# Patient Record
Sex: MALE | Race: WHITE | URBAN - METROPOLITAN AREA
[De-identification: names, ages, dates, MRNs, and addresses within clinical notes are randomized per-mention and may not be internally consistent; named-entity substitution may affect disease eponyms.]

---

## 2017-11-05 ENCOUNTER — OFFICE VISIT (OUTPATIENT)
Dept: URGENT CARE | Facility: URGENT CARE | Age: 52
End: 2017-11-05
Payer: COMMERCIAL

## 2017-11-05 VITALS
SYSTOLIC BLOOD PRESSURE: 162 MMHG | OXYGEN SATURATION: 98 % | RESPIRATION RATE: 14 BRPM | WEIGHT: 211.2 LBS | TEMPERATURE: 97.6 F | DIASTOLIC BLOOD PRESSURE: 83 MMHG | HEART RATE: 67 BPM

## 2017-11-05 DIAGNOSIS — G56.22 ULNAR NEUROPATHY AT ELBOW OF LEFT UPPER EXTREMITY: ICD-10-CM

## 2017-11-05 DIAGNOSIS — I10 BENIGN ESSENTIAL HYPERTENSION: Chronic | ICD-10-CM

## 2017-11-05 DIAGNOSIS — M77.02 MEDIAL EPICONDYLITIS OF ELBOW, LEFT: Primary | ICD-10-CM

## 2017-11-05 PROCEDURE — 99203 OFFICE O/P NEW LOW 30 MIN: CPT | Performed by: FAMILY MEDICINE

## 2017-11-05 RX ORDER — GABAPENTIN 300 MG/1
CAPSULE ORAL
Qty: 90 CAPSULE | Refills: 0 | Status: SHIPPED | OUTPATIENT
Start: 2017-11-05 | End: 2017-12-02

## 2017-11-05 ASSESSMENT — PAIN SCALES - GENERAL: PAINLEVEL: SEVERE PAIN (7)

## 2017-11-05 NOTE — PATIENT INSTRUCTIONS
Understanding Medial Epicondylitis    Several muscles attach to the arm at the elbow joint. The tough bands of tissue that attach muscle to bones are called tendons. The bone in the upper arm has knobs on the farthest end called epicondyles. Tendons attach some arm muscles to these knobs. The tissues in this area can become irritated.  Epicondylitis is the medical term for a painful elbow over the epicondyle. Medial refers to the inner side of the elbow. Medial epicondylitis is sometimes called  golfer s elbow.      How to say it  BABS[facility]iffani[facility]-LNXR-dye-lie-tis   Causes of medial epicondylitis  A painful inner elbow may be caused by:    Using an elbow or hand the same way over and over    Using poor form or too much force in a sport such as golf, tennis, or baseball    Lifting too heavy a weight    Other injuries to the arm or elbow  Symptoms of medial epicondylitis    Pain or tenderness on the inside of the elbow that may travel down the forearm    Pain when moving the wrist    Pain or weakness when gripping something    A crackling sound or grating feeling when moving the elbow  Treatment for medial epicondylitis  Treatments may include:    Avoiding or changing the action that caused the problem. This helps prevent irritating the tissues more.    Prescription or over-the-counter pain medicines. These help reduce inflammation, swelling, and pain.    Cold or heat packs. These help reduce pain and swelling.    Stretching and other exercises. These improve flexibility and strength.    Physical therapy. This may include exercises or other treatments.    Injections of medicine. This may relieve symptoms.  If other treatments do not relieve symptoms, you may need surgery.  Possible complications  If you don t give your elbow time to heal, symptoms may return or get worse. Follow your healthcare provider s instructions on resting and treating your elbow.     When to call your healthcare provider  Call your  "healthcare provider right away if you have any of these:    Fever of 100.4 F (38 C) or higher, or as directed    Redness, swelling, or warmth that gets worse    Symptoms that don t get better with prescribed medicines, or get worse    New symptoms   Date Last Reviewed: 3/10/2016    0489-1785 [employer] [address] [place] [address], [place] [address]. All rights reserved. This information is not intended as a substitute for professional medical care. Always follow your healthcare professional's instructions.        Ulnar Nerve Palsy  The ulnar nerve travels down the arm from the shoulder to the hand. It controls movement and feeling in the wrist and hand. Damage to this nerve can cause a condition called ulnar nerve palsy.  A common cause of ulnar nerve palsy is leaning on the elbow for long periods of time. Injury to the arm or elbow, such as a fracture, can also damage the ulnar nerve.  Symptoms of ulnar nerve palsy include:    Numbness, tingling, or burning (often described as \"pins and needles\")    Pain    Weakness in the hand and fingers  The treatment depends on the cause of the nerve damage. In some cases, the problem will go away on its once pressure to the nerve is relieved. Splinting the wrist to limit movement may help with healing. If the problem is due to trauma or injury, physical therapy may be prescribed. Corticosteroids injections into the area may reduce swelling and pressure on the nerve. Surgery to repair the nerve may be needed for chronic symptoms that do not respond to simpler treatments.  Home care    Rest the wrist and arm until normal feeling and strength return.    If you were given a splint or sling, wear it as directed.    Avoid positions leaning on your elbows.    If medicines were prescribed for pain or nerve sensations, take them as directed.  Follow-up care  Follow up with your healthcare provider or as advised by our staff.  When to seek medical advice  Call your healthcare " provider right away if you have any of the following:    Increasing arm swelling or pain    Numbness or weakness of the arm    Symptoms spread to other parts of the body    Slurred speech, confusion    Trouble speaking, walking, or seeing  Date Last Reviewed: 9/25/2015 2000-2017 [employer]. [address] [place] [address]. All rights reserved. This information is not intended as a substitute for professional medical care. Always follow your healthcare professional's instructions.

## 2017-11-05 NOTE — NURSING NOTE
Chief Complaint   Patient presents with     Tendon Injury     Possible tendon injury in left forearm       Initial /83  Pulse 67  Temp 97.6  F (36.4  C) (Oral)  Resp 14  Wt 211 lb 3.2 oz (95.8 kg)  SpO2 98% There is no height or weight on file to calculate BMI.  Medication Reconciliation: complete     Keke Chavarria, CMA

## 2017-11-05 NOTE — MR AVS SNAPSHOT
After Visit Summary   11/5/2017    James Hernandez    MRN: [id number]           Patient Information     Date Of Birth          1965        Visit Information        Provider Department      11/5/2017 2:25 PM Benita Fenton MD [facility]        Today's Diagnoses     Medial epicondylitis of elbow, left    -  1    Ulnar neuropathy at elbow of left upper extremity        Benign essential hypertension          Care Instructions      Understanding Medial Epicondylitis    Several muscles attach to the arm at the elbow joint. The tough bands of tissue that attach muscle to bones are called tendons. The bone in the upper arm has knobs on the farthest end called epicondyles. Tendons attach some arm muscles to these knobs. The tissues in this area can become irritated.  Epicondylitis is the medical term for a painful elbow over the epicondyle. Medial refers to the inner side of the elbow. Medial epicondylitis is sometimes called  golfer s elbow.      How to say it  BABS-tiffani-lex cw-svj-HIXN-dye-lie-tis   Causes of medial epicondylitis  A painful inner elbow may be caused by:    Using an elbow or hand the same way over and over    Using poor form or too much force in a sport such as golf, tennis, or baseball    Lifting too heavy a weight    Other injuries to the arm or elbow  Symptoms of medial epicondylitis    Pain or tenderness on the inside of the elbow that may travel down the forearm    Pain when moving the wrist    Pain or weakness when gripping something    A crackling sound or grating feeling when moving the elbow  Treatment for medial epicondylitis  Treatments may include:    Avoiding or changing the action that caused the problem. This helps prevent irritating the tissues more.    Prescription or over-the-counter pain medicines. These help reduce inflammation, swelling, and pain.    Cold or heat packs. These help reduce pain and swelling.    Stretching and other exercises. These  "improve flexibility and strength.    Physical therapy. This may include exercises or other treatments.    Injections of medicine. This may relieve symptoms.  If other treatments do not relieve symptoms, you may need surgery.  Possible complications  If you don t give your elbow time to heal, symptoms may return or get worse. Follow your healthcare provider s instructions on resting and treating your elbow.     When to call your healthcare provider  Call your healthcare provider right away if you have any of these:    Fever of 100.4 F (38 C) or higher, or as directed    Redness, swelling, or warmth that gets worse    Symptoms that don t get better with prescribed medicines, or get worse    New symptoms   Date Last Reviewed: 3/10/2016    8692-1202 [employer]. [address] [place] [address], [place] [address]. All rights reserved. This information is not intended as a substitute for professional medical care. Always follow your healthcare professional's instructions.        Ulnar Nerve Palsy  The ulnar nerve travels down the arm from the shoulder to the hand. It controls movement and feeling in the wrist and hand. Damage to this nerve can cause a condition called ulnar nerve palsy.  A common cause of ulnar nerve palsy is leaning on the elbow for long periods of time. Injury to the arm or elbow, such as a fracture, can also damage the ulnar nerve.  Symptoms of ulnar nerve palsy include:    Numbness, tingling, or burning (often described as \"pins and needles\")    Pain    Weakness in the hand and fingers  The treatment depends on the cause of the nerve damage. In some cases, the problem will go away on its once pressure to the nerve is relieved. Splinting the wrist to limit movement may help with healing. If the problem is due to trauma or injury, physical therapy may be prescribed. Corticosteroids injections into the area may reduce swelling and pressure on the nerve. Surgery to repair the nerve may be needed for " chronic symptoms that do not respond to simpler treatments.  Home care    Rest the wrist and arm until normal feeling and strength return.    If you were given a splint or sling, wear it as directed.    Avoid positions leaning on your elbows.    If medicines were prescribed for pain or nerve sensations, take them as directed.  Follow-up care  Follow up with your healthcare provider or as advised by our staff.  When to seek medical advice  Call your healthcare provider right away if you have any of the following:    Increasing arm swelling or pain    Numbness or weakness of the arm    Symptoms spread to other parts of the body    Slurred speech, confusion    Trouble speaking, walking, or seeing  Date Last Reviewed: 9/25/2015 2000-2017 [employer]. [address] [place] [address], [place] [address]. All rights reserved. This information is not intended as a substitute for professional medical care. Always follow your healthcare professional's instructions.                Follow-ups after your visit        Additional Services     ORTHO  REFERRAL       [facility] is referring you to the Orthopedic  Services at [facility].       The  Representative will assist you in the coordination of your Orthopedic and Musculoskeletal Care as prescribed by your physician.    The  Representative will call you within 1 business day to help schedule your appointment, or you may contact the  Representative at:    All areas ~ [phone]     Type of Referral : Non Surgical       Timeframe requested: Routine    Coverage of these services is subject to the terms and limitations of your health insurance plan.  Please call member services at your health plan with any benefit or coverage questions.      If X-rays, CT or MRI's have been performed, please contact the facility where they were done to arrange for , prior to your scheduled  "appointment.  Please bring this referral request to your appointment and present it to your specialist.                  Who to contact     If you have questions or need follow up information about today's clinic visit or your schedule please contact [facility] directly at [phone].  Normal or non-critical lab and imaging results will be communicated to you by MyChart, letter or phone within 4 business days after the clinic has received the results. If you do not hear from us within 7 days, please contact the clinic through [employer] or phone. If you have a critical or abnormal lab result, we will notify you by phone as soon as possible.  Submit refill requests through [employer] or call your pharmacy and they will forward the refill request to us. Please allow 3 business days for your refill to be completed.          Additional Information About Your Visit        [employer] Information     [employer] lets you send messages to your doctor, view your test results, renew your prescriptions, schedule appointments and more. To sign up, go to [email or web address] . Click on \"Log in\" on the left side of the screen, which will take you to the Welcome page. Then click on \"Sign up Now\" on the right side of the page.     You will be asked to enter the access code listed below, as well as some personal information. Please follow the directions to create your username and password.     Your access code is: [email or web address]  Expires: 2/3/2018  3:00 PM     Your access code will  in 90 days. If you need help or a new code, please call your [facility] or [phone].        Care EveryWhere ID     This is your Care EveryWhere ID. This could be used by other organizations to access your [facility] medical records  [id number]        Your Vitals Were     Pulse Temperature Respirations Pulse Oximetry          67 97.6  F (36.4  C) (Oral) 14 98%         Blood Pressure from Last 3 Encounters:   17 162/83    Weight " from Last 3 Encounters:   11/05/17 211 lb 3.2 oz (95.8 kg)              We Performed the Following     ORTHO  REFERRAL          Today's Medication Changes          These changes are accurate as of: 11/5/17  3:00 PM.  If you have any questions, ask your nurse or doctor.               Start taking these medicines.        Dose/Directions    gabapentin 300 MG capsule   Commonly known as:  NEURONTIN   Used for:  Medial epicondylitis of elbow, left, Ulnar neuropathy at elbow of left upper extremity   Started by:  Benita Fenton MD        Take 1 tablet (300 mg) every night for 1-3 days, then 1 tablet twice daily for 1-3 days, then 1 tablet three times daily   Quantity:  90 capsule   Refills:  0            Where to get your medicines      These medications were sent to [facility] [address] [place] [address] [address] [place] [address] AT [facility]  [address] [facility] [address]     Phone:  [phone]     gabapentin 300 MG capsule                Primary Care Provider Office Phone # Fax #    [facility] [phone] [address] [facility] [address]        Equal Access to Services     SAUNDRA MARTINEZ : Hadii aad ku hadasho Soomaali, waaxda luqadaha, qaybta kaalmada adeegyada, waxay omariin haykelsin smith jacobson. So [facility] [phone].    ATENCIÓN: Si habla español, tiene a cutler disposición servicios gratuitos de asistencia lingüística. Meganame al [phone].    We comply with applicable federal civil rights laws and [place] laws. We do not discriminate on the basis of race, color, national origin, age, disability, sex, sexual orientation, or gender identity.            Thank you!     Thank you for choosing [facility]  for your care. Our goal is always to provide you with excellent care. Hearing back from our patients is one way we can continue to improve our services. Please take a few minutes to complete the written  survey that you may receive in the mail after your visit with us. Thank you!             Your Updated Medication List - Protect others around you: Learn how to safely use, store and throw away your medicines at www.disposemymeds.org.          This list is accurate as of: 11/5/17  3:00 PM.  Always use your most recent med list.                   Brand Name Dispense Instructions for use Diagnosis    gabapentin 300 MG capsule    NEURONTIN    90 capsule    Take 1 tablet (300 mg) every night for 1-3 days, then 1 tablet twice daily for 1-3 days, then 1 tablet three times daily    Medial epicondylitis of elbow, left, Ulnar neuropathy at elbow of left upper extremity

## 2017-11-09 ENCOUNTER — RADIANT APPOINTMENT (OUTPATIENT)
Dept: GENERAL RADIOLOGY | Facility: CLINIC | Age: 52
End: 2017-11-09
Attending: ORTHOPAEDIC SURGERY
Payer: COMMERCIAL

## 2017-11-09 ENCOUNTER — OFFICE VISIT (OUTPATIENT)
Dept: ORTHOPEDICS | Facility: CLINIC | Age: 52
End: 2017-11-09
Payer: COMMERCIAL

## 2017-11-09 VITALS — BODY MASS INDEX: 28.58 KG/M2 | RESPIRATION RATE: 12 BRPM | WEIGHT: 211 LBS | HEIGHT: 72 IN

## 2017-11-09 DIAGNOSIS — M77.02 MEDIAL EPICONDYLITIS OF ELBOW, LEFT: Primary | ICD-10-CM

## 2017-11-09 DIAGNOSIS — G56.22 LESION OF LEFT ULNAR NERVE: ICD-10-CM

## 2017-11-09 DIAGNOSIS — M77.02 MEDIAL EPICONDYLITIS OF ELBOW, LEFT: ICD-10-CM

## 2017-11-09 PROCEDURE — 20551 NJX 1 TENDON ORIGIN/INSJ: CPT | Mod: LT | Performed by: ORTHOPAEDIC SURGERY

## 2017-11-09 PROCEDURE — 99203 OFFICE O/P NEW LOW 30 MIN: CPT | Mod: 25 | Performed by: ORTHOPAEDIC SURGERY

## 2017-11-09 PROCEDURE — 73080 X-RAY EXAM OF ELBOW: CPT | Mod: LT

## 2017-11-09 ASSESSMENT — PAIN SCALES - GENERAL: PAINLEVEL: MODERATE PAIN (5)

## 2017-11-09 NOTE — PROGRESS NOTES
SUBJECTIVE:  James Hernandez is a 52 year old male who is seen in consultation at the request of Dr. Benita Fenton for left elbow pain that started 3 weeks ago possibly from overuse moving boxes and doing yard work. He reports he had a couple episodes of snapping in the medial elbow accompanied by paraesthesia to the ulnar two digits.    Present symptoms: Throbbing at night, positional night pain, pain mainly medial elbow and radiates distally, pain lifting anything, pain pulling object, pushing across body or pushing a drawer. Numbness and tingling in the ulnar two digits.     Previous treatment: NSAIDS, tennis-elbow strap, heating pad, stretching and icing.     Prior history of related problems: no prior problems with this area in the past.    Patients past medical, surgical, social and family histories reviewed.   History reviewed. No pertinent past medical history.     History reviewed. No pertinent surgical history.     REVIEW OF SYSTEMS:  CONSTITUTIONAL:  NEGATIVE for fever, chills, change in weight  INTEGUMENTARY/SKIN:  NEGATIVE for worrisome rashes, moles or lesions  EYES:  NEGATIVE for vision changes or irritation  ENT/MOUTH:  NEGATIVE for ear, mouth and throat problems  RESP:  NEGATIVE for significant cough or SOB  BREAST:  NEGATIVE for masses, tenderness or discharge  CV:  NEGATIVE for chest pain, palpitations or peripheral edema  GI:  NEGATIVE for nausea, abdominal pain, heartburn, or change in bowel habits  :  Negative   MUSCULOSKELETAL:  See HPI above  NEURO:  NEGATIVE for weakness, dizziness or paresthesias  ENDOCRINE:  NEGATIVE for temperature intolerance, skin/hair changes  HEME/ALLERGY/IMMUNE:  NEGATIVE for bleeding problems  PSYCHIATRIC:  NEGATIVE for changes in mood or affect    PHYSICAL EXAM:  Resp 12  Ht 1.829 m (6')  Wt 95.7 kg (211 lb)  BMI 28.62 kg/m2  GENERAL APPEARANCE: healthy, alert and no distress   SKIN: no suspicious lesions or rashes  NEURO: Normal strength and tone, mentation  intact and speech normal  VASCULAR: good pulses, and cappillary refill   LYMPH: no lymphadenopathy   PSYCH:  mentation appears normal and affect normal/bright  RESP: no increased work of breathing     MUSCULOSKELETAL:  LEFT ELBOW:     strength: weak and painful  ROM: Mild pain with resisted wrist flexion, pain mainly with resisted supination  Tender: over medial epicondyle  Tinel's: positive over cubital tunnel.  He had some questionable tinel's responses anterior to the medial epicondyle as well.  Ligaments: Ulnar collateral ligament is stable in all postiion of flexion without pain  Sensation: diminished in the 5th finger, left hand  Froment's: negative    ASSESSMENT/PLAN:  1. Medial epicondylitis, left elbow  2. Cubital tunnel syndrome  3. Possible subluxing ulnar nerve, left     Treatment options:  * treatment is nonoperative, with surgical treatment reserved for recalcitrant symptoms despite long-term nonperative treatment regimen. Most cases expected to resolve over 1-2 years based on natural history.    Treatment includes:  * rest  * activity modification - avoid aggravating activities and reduce strenuous activities for 6-8 weeks  * ice, ice massage  * Physical therapy, modalities as indicated including ultrasound, massage, iontophoresis  * counterforce elbow brace/strap, available OTC  * wrist brace to prevent wrist extension and altered tennis-elbow wear  * modify lifting technique, palm upwards with lifting to relieve stress on extensor tendons of wrist.  * NSAIDS regularly three times daily x2-3 weeks, if able to take  * cortisone injection discussed, which is normally placed at point of maximal tenderness, and distributed in a peppering fashion to stimulate a healing response.  *surgery  * all questions were answered     We decided to proceed with a steroid injection, physical therapy, and antiinflammatories as needed. Informed consent obtained. A Left medial epicondyle steroid injection was  performed using 1mL Depo Medrol 80 mg per mL and 2 cc local anesthetic after sterile prep using a peppering technique. Risks, benefits and complications of the injection were discussed with the patient and the patient elected to proceed. I made sure that there was no nerve in the area of injection.This was tolerated well by the patient.     *return to clinic as needed.  Consider other treatment options listed if the present plan fails.    SUDHA Sandra MD  Dept. Orthopedic Surgery  [facility]    This document serves as a record of the services and decisions personally performed and made by Dr. SUDHA Sandra MD. It was created on his behalf by Gt Verduzco, a trained medical scribe. The creation of this record is based on the provider's personal observations and the statements of the patient. This document has been checked and approved by the attending provider.   Gt Verduzco November 9, 2017 4:15 PM

## 2017-11-09 NOTE — LETTER
11/9/2017         RE: James Hernandez  [address] [facility] [address]        Dear Colleague,    Thank you for referring your patient, James Hernandez, to the [facility]. Please see a copy of my visit note below.    SUBJECTIVE:  James Hernandez is a 52 year old male who is seen in consultation at the request of Dr. Benita Fenton for left elbow pain that started 3 weeks ago possibly from overuse moving boxes and doing yard work. He reports he had a couple episodes of snapping in the medial elbow accompanied by paraesthesia to the ulnar two digits.    Present symptoms: Throbbing at night, positional night pain, pain mainly medial elbow and radiates distally, pain lifting anything, pain pulling object, pushing across body or pushing a drawer. Numbness and tingling in the ulnar two digits.     Previous treatment: NSAIDS, tennis-elbow strap, heating pad, stretching and icing.     Prior history of related problems: no prior problems with this area in the past.    Patients past medical, surgical, social and family histories reviewed.   History reviewed. No pertinent past medical history.     History reviewed. No pertinent surgical history.     REVIEW OF SYSTEMS:  CONSTITUTIONAL:  NEGATIVE for fever, chills, change in weight  INTEGUMENTARY/SKIN:  NEGATIVE for worrisome rashes, moles or lesions  EYES:  NEGATIVE for vision changes or irritation  ENT/MOUTH:  NEGATIVE for ear, mouth and throat problems  RESP:  NEGATIVE for significant cough or SOB  BREAST:  NEGATIVE for masses, tenderness or discharge  CV:  NEGATIVE for chest pain, palpitations or peripheral edema  GI:  NEGATIVE for nausea, abdominal pain, heartburn, or change in bowel habits  :  Negative   MUSCULOSKELETAL:  See HPI above  NEURO:  NEGATIVE for weakness, dizziness or paresthesias  ENDOCRINE:  NEGATIVE for temperature intolerance, skin/hair changes  HEME/ALLERGY/IMMUNE:  NEGATIVE for bleeding problems  PSYCHIATRIC:  NEGATIVE for changes in mood or  affect    PHYSICAL EXAM:  Resp 12  Ht 1.829 m (6')  Wt 95.7 kg (211 lb)  BMI 28.62 kg/m2  GENERAL APPEARANCE: healthy, alert and no distress   SKIN: no suspicious lesions or rashes  NEURO: Normal strength and tone, mentation intact and speech normal  VASCULAR: good pulses, and cappillary refill   LYMPH: no lymphadenopathy   PSYCH:  mentation appears normal and affect normal/bright  RESP: no increased work of breathing     MUSCULOSKELETAL:  LEFT ELBOW:     strength: weak and painful  ROM: Mild pain with resisted wrist flexion, pain mainly with resisted supination  Tender: over medial epicondyle  Tinel's: positive over cubital tunnel.  He had some questionable tinel's responses anterior to the medial epicondyle as well.  Ligaments: Ulnar collateral ligament is stable in all postiion of flexion without pain  Sensation: diminished in the 5th finger, left hand  Froment's: negative    ASSESSMENT/PLAN:  1. Medial epicondylitis, left elbow  2. Cubital tunnel syndrome  3. Possible subluxing ulnar nerve, left     Treatment options:  * treatment is nonoperative, with surgical treatment reserved for recalcitrant symptoms despite long-term nonperative treatment regimen. Most cases expected to resolve over 1-2 years based on natural history.    Treatment includes:  * rest  * activity modification - avoid aggravating activities and reduce strenuous activities for 6-8 weeks  * ice, ice massage  * Physical therapy, modalities as indicated including ultrasound, massage, iontophoresis  * counterforce elbow brace/strap, available OTC  * wrist brace to prevent wrist extension and altered tennis-elbow wear  * modify lifting technique, palm upwards with lifting to relieve stress on extensor tendons of wrist.  * NSAIDS regularly three times daily x2-3 weeks, if able to take  * cortisone injection discussed, which is normally placed at point of maximal tenderness, and distributed in a peppering fashion to stimulate a healing  response.  *surgery  * all questions were answered     We decided to proceed with a steroid injection, physical therapy, and antiinflammatories as needed. Informed consent obtained. A Left medial epicondyle steroid injection was performed using 1mL Depo Medrol 80 mg per mL and 2 cc local anesthetic after sterile prep using a peppering technique. Risks, benefits and complications of the injection were discussed with the patient and the patient elected to proceed. I made sure that there was no nerve in the area of injection.This was tolerated well by the patient.     *return to clinic as needed.  Consider other treatment options listed if the present plan fails.    SUDHA Sandra MD  Dept. Orthopedic Surgery  [facility]    This document serves as a record of the services and decisions personally performed and made by Dr. SUDHA Sandra MD. It was created on his behalf by Gt Verduzco, a trained medical scribe. The creation of this record is based on the provider's personal observations and the statements of the patient. This document has been checked and approved by the attending provider.   Gt Verduzco November 9, 2017 4:15 PM           Again, thank you for allowing me to participate in the care of your patient.        Sincerely,        Vineet Sandra MD

## 2017-11-09 NOTE — MR AVS SNAPSHOT
"              After Visit Summary   2017    James Hernandez    MRN: [id number]           Patient Information     Date Of Birth          1965        Visit Information        Provider Department      2017 3:00 PM Vineet Sandra MD [facility]         Follow-ups after your visit        Who to contact     If you have questions or need follow up information about today's clinic visit or your schedule please contact [facility] directly at [phone].  Normal or non-critical lab and imaging results will be communicated to you by Corefinohart, letter or phone within 4 business days after the clinic has received the results. If you do not hear from us within 7 days, please contact the clinic through Corefinohart or phone. If you have a critical or abnormal lab result, we will notify you by phone as soon as possible.  Submit refill requests through Spinback or call your pharmacy and they will forward the refill request to us. Please allow 3 business days for your refill to be completed.          Additional Information About Your Visit        [facility] Information     Spinback lets you send messages to your doctor, view your test results, renew your prescriptions, schedule appointments and more. To sign up, go to [email or web address] . Click on \"Log in\" on the left side of the screen, which will take you to the Welcome page. Then click on \"Sign up Now\" on the right side of the page.     You will be asked to enter the access code listed below, as well as some personal information. Please follow the directions to create your username and password.     Your access code is: [email or web address]  Expires: 2/3/2018  3:00 PM     Your access code will  in 90 days. If you need help or a new code, please call your [place] clinic or [phone].        Care EveryWhere ID     This is your Care EveryWhere ID. This could be used by other organizations to access your [place] medical " records  [id number]        Your Vitals Were     Respirations Height BMI (Body Mass Index)             12 1.829 m (6') 28.62 kg/m2          Blood Pressure from Last 3 Encounters:   11/05/17 162/83    Weight from Last 3 Encounters:   11/09/17 95.7 kg (211 lb)   11/05/17 95.8 kg (211 lb 3.2 oz)              Today, you had the following     No orders found for display       Primary Care Provider Office Phone # Fax #    [facility] [phone] [address] [facility] [address]        Equal Access to Services     [facility] : Hadii noemy shafer hadasho Soomaali, waaxda luqadaha, qaybta kaalmada adevinceyada, jamal forrester . So [facility] [phone].    ATENCIÓN: Si habla español, tiene a cutler disposición servicios gratuitos de asistencia lingüística. Llame al [phone].    We comply with applicable federal civil rights laws and [place] laws. We do not discriminate on the basis of race, color, national origin, age, disability, sex, sexual orientation, or gender identity.            Thank you!     Thank you for choosing [facility]  for your care. Our goal is always to provide you with excellent care. Hearing back from our patients is one way we can continue to improve our services. Please take a few minutes to complete the written survey that you may receive in the mail after your visit with us. Thank you!             Your Updated Medication List - Protect others around you: Learn how to safely use, store and throw away your medicines at www.disposemymeds.org.          This list is accurate as of: 11/9/17  3:48 PM.  Always use your most recent med list.                   Brand Name Dispense Instructions for use Diagnosis    gabapentin 300 MG capsule    NEURONTIN    90 capsule    Take 1 tablet (300 mg) every night for 1-3 days, then 1 tablet twice daily for 1-3 days, then 1 tablet three times daily    Medial epicondylitis of elbow, left, Ulnar neuropathy  at elbow of left upper extremity

## 2017-12-21 ENCOUNTER — OFFICE VISIT (OUTPATIENT)
Dept: ORTHOPEDICS | Facility: CLINIC | Age: 52
End: 2017-12-21
Payer: COMMERCIAL

## 2017-12-21 VITALS — BODY MASS INDEX: 28.58 KG/M2 | HEIGHT: 72 IN | RESPIRATION RATE: 14 BRPM | WEIGHT: 211 LBS

## 2017-12-21 DIAGNOSIS — M77.02 MEDIAL EPICONDYLITIS OF ELBOW, LEFT: Primary | ICD-10-CM

## 2017-12-21 DIAGNOSIS — G56.22 LESION OF LEFT ULNAR NERVE: ICD-10-CM

## 2017-12-21 PROCEDURE — 99213 OFFICE O/P EST LOW 20 MIN: CPT | Performed by: ORTHOPAEDIC SURGERY

## 2017-12-21 ASSESSMENT — PAIN SCALES - GENERAL: PAINLEVEL: MODERATE PAIN (4)

## 2017-12-21 NOTE — MR AVS SNAPSHOT
"              After Visit Summary   2017    James Hernandez    MRN: [id number]           Patient Information     Date Of Birth          1965        Visit Information        Provider Department      2017 9:45 AM Vineet Sandra MD [facility]        Today's Diagnoses     Medial epicondylitis of elbow, left    -  1    Lesion of left ulnar nerve           Follow-ups after your visit        Who to contact     If you have questions or need follow up information about today's clinic visit or your schedule please contact [facility] directly at [phone].  Normal or non-critical lab and imaging results will be communicated to you by ShowMe.tvhart, letter or phone within 4 business days after the clinic has received the results. If you do not hear from us within 7 days, please contact the clinic through ShowMe.tvhart or phone. If you have a critical or abnormal lab result, we will notify you by phone as soon as possible.  Submit refill requests through GroundMetrics or call your pharmacy and they will forward the refill request to us. Please allow 3 business days for your refill to be completed.          Additional Information About Your Visit        MyChart Information     GroundMetrics lets you send messages to your doctor, view your test results, renew your prescriptions, schedule appointments and more. To sign up, go to [email or web address] . Click on \"Log in\" on the left side of the screen, which will take you to the Welcome page. Then click on \"Sign up Now\" on the right side of the page.     You will be asked to enter the access code listed below, as well as some personal information. Please follow the directions to create your username and password.     Your access code is: [email or web address]  Expires: 2/3/2018  3:00 PM     Your access code will  in 90 days. If you need help or a new code, please call your [facility] or [phone].        Care EveryWhere ID     This is your Care " EveryWhere ID. This could be used by other organizations to access your [place] medical records  [id number]        Your Vitals Were     Respirations Height BMI (Body Mass Index)             14 1.829 m (6') 28.62 kg/m2          Blood Pressure from Last 3 Encounters:   11/05/17 162/83    Weight from Last 3 Encounters:   12/21/17 95.7 kg (211 lb)   11/09/17 95.7 kg (211 lb)   11/05/17 95.8 kg (211 lb 3.2 oz)              Today, you had the following     No orders found for display         Today's Medication Changes          These changes are accurate as of: 12/21/17 12:59 PM.  If you have any questions, ask your nurse or doctor.               Stop taking these medicines if you haven't already. Please contact your care team if you have questions.     gabapentin 300 MG capsule   Commonly known as:  NEURONTIN   Stopped by:  Vineet Sandra MD                    Primary Care Provider Office Phone # Fax #    [facility] [phone] [address] [facility] [address]        Equal Access to Services     [facility]: Hadii aad ku hadasho Soomaali, waaxda luqadaha, qaybta kaalmada adebrandon, jamal forrester . So [facility] [phone].    ATENCIÓN: Si habla español, tiene a cutler disposición servicios gratuitos de asistencia lingüística. [facility] [phone].    We comply with applicable federal civil rights laws and [place] laws. We do not discriminate on the basis of race, color, national origin, age, disability, sex, sexual orientation, or gender identity.            Thank you!     Thank you for choosing [facility]  for your care. Our goal is always to provide you with excellent care. Hearing back from our patients is one way we can continue to improve our services. Please take a few minutes to complete the written survey that you may receive in the mail after your visit with us. Thank you!             Your Updated Medication List - Protect  others around you: Learn how to safely use, store and throw away your medicines at www.disposemymeds.org.          This list is accurate as of: 12/21/17 12:59 PM.  Always use your most recent med list.                   Brand Name Dispense Instructions for use Diagnosis    IBUPROFEN PO      Take 600 mg by mouth as needed for moderate pain        TYLENOL PO      Take 500 mg by mouth as needed for mild pain or fever

## 2017-12-21 NOTE — LETTER
12/21/2017         RE: James Hernandez  [address] [facility] [address]        Dear Colleague,    Thank you for referring your patient, James Hernandez, to the [facility] [place] [facility]. Please see a copy of my visit note below.    HISTORY OF PRESENT ILLNESS:    James Hernandez is a 52 year old male who is seen in follow up for his left elbow medial epicondylitis. The injection, done on 11/9/2017, lasted until about 1 week ago, lasting 6 weeks total. He could do normal activities, with minimal pain, rated a 1/10. Since last week, his pain has increased, and is worsening. Recall that with popping in the elbow, he will have some numbness and tingling in the fingers. He has not had recent episodes of nerve subluxation symptoms. Every day activities such as opening drawers cause pain. Elbow strap and wrist brace do not seem to work much. Still has some mild numbness in the small finger. Unable to attend physical therapy due to time constraints. Has been doing home stretching, icing and heating. Also is alternating between Aleve and Tylenol. Has also tried taking gabapentin, which he notes helped him.     Present symptoms: + pain with activities such as opening drawers, extending the elbow, + numbness and tingling  Treatments tried to this point: Aleve, Tylenol, cortisone injection on 11/9/2017    PHYSICAL EXAM:  Resp 14  Ht 1.829 m (6')  Wt 95.7 kg (211 lb)  BMI 28.62 kg/m2  Body mass index is 28.62 kg/(m^2).   GENERAL APPEARANCE: healthy, alert and no distress   SKIN: no suspicious lesions or rashes  NEURO: Normal strength and tone, mentation intact and speech normal  VASCULAR: good pulses, and capillary refill   LYMPH: no lymphadenopathy   PSYCH:  mentation appears normal and affect normal/bright    MSK:  LEFT ELBOW:     strength: weak and painful  ROM: Mild pain with resisted wrist flexion, pain mainly with resisted supination  Tender: over medial epicondyle  Tinel's: positive over cubital tunnel.  Palpable  nerve moving over medial epicondyle  Ligaments: Ulnar collateral ligament is stable in all postiion of flexion without pain  Sensation: diminished in the 5th finger, left hand  Froment's: negative    Imaging: none indicated today.     Impression: 52 year old male with left elbow pain, lateral epicondylitis.    Plan:   Discussed with patient that if he would like a repeat steroid injection today, I would not be opposed to the idea. However, I would prefer it if he wait until after the 3 month jose for the injection. Patient understands and will consider his options. Explained that should continue doing at home exercises multiple times daily. Consider attending physical therapy despite time constraints. If  gabapentin, appears to help him, I have no problem with him continuing taking these medications. I would not recommend he take any narcotic pain medications.  Total time spent was 20 minutes; with 15 minutes spent face-to-face with patient dedicated to education, counseling and a development of a treatment plan.    Return to clinic as neededfor re-examination and possible repeat injection.  He may need ulnar nerve transposition, (submuscular) down the road.    The information in this document, created by a scribe for me, accurately reflects the services I personally performed and the decisions made by me. I have reviewed and approved this document for accuracy.        SUDHA Sandra MD  Dept. Orthopedic Surgery  [facility]         Again, thank you for allowing me to participate in the care of your patient.        Sincerely,        Vineet Sandra MD

## 2017-12-21 NOTE — PROGRESS NOTES
HISTORY OF PRESENT ILLNESS:    James Hernandez is a 52 year old male who is seen in follow up for his left elbow medial epicondylitis. The injection, done on 11/9/2017, lasted until about 1 week ago, lasting 6 weeks total. He could do normal activities, with minimal pain, rated a 1/10. Since last week, his pain has increased, and is worsening. Recall that with popping in the elbow, he will have some numbness and tingling in the fingers. He has not had recent episodes of nerve subluxation symptoms. Every day activities such as opening drawers cause pain. Elbow strap and wrist brace do not seem to work much. Still has some mild numbness in the small finger. Unable to attend physical therapy due to time constraints. Has been doing home stretching, icing and heating. Also is alternating between Aleve and Tylenol. Has also tried taking gabapentin, which he notes helped him.     Present symptoms: + pain with activities such as opening drawers, extending the elbow, + numbness and tingling  Treatments tried to this point: Aleve, Tylenol, cortisone injection on 11/9/2017    PHYSICAL EXAM:  Resp 14  Ht 1.829 m (6')  Wt 95.7 kg (211 lb)  BMI 28.62 kg/m2  Body mass index is 28.62 kg/(m^2).   GENERAL APPEARANCE: healthy, alert and no distress   SKIN: no suspicious lesions or rashes  NEURO: Normal strength and tone, mentation intact and speech normal  VASCULAR: good pulses, and capillary refill   LYMPH: no lymphadenopathy   PSYCH:  mentation appears normal and affect normal/bright    MSK:  LEFT ELBOW:     strength: weak and painful  ROM: Mild pain with resisted wrist flexion, pain mainly with resisted supination  Tender: over medial epicondyle  Tinel's: positive over cubital tunnel.  Palpable nerve moving over medial epicondyle  Ligaments: Ulnar collateral ligament is stable in all postiion of flexion without pain  Sensation: diminished in the 5th finger, left hand  Froment's: negative    Imaging: none indicated today.      Impression: 52 year old male with left elbow pain, lateral epicondylitis.    Plan:   Discussed with patient that if he would like a repeat steroid injection today, I would not be opposed to the idea. However, I would prefer it if he wait until after the 3 month jose for the injection. Patient understands and will consider his options. Explained that should continue doing at home exercises multiple times daily. Consider attending physical therapy despite time constraints. If  gabapentin, appears to help him, I have no problem with him continuing taking these medications. I would not recommend he take any narcotic pain medications.  Total time spent was 20 minutes; with 15 minutes spent face-to-face with patient dedicated to education, counseling and a development of a treatment plan.    Return to clinic as neededfor re-examination and possible repeat injection.  He may need ulnar nerve transposition, (submuscular) down the road.    The information in this document, created by a scribe for me, accurately reflects the services I personally performed and the decisions made by me. I have reviewed and approved this document for accuracy.        SUDHA Sandra MD  Dept. Orthopedic Surgery  [facility]

## 2017-12-21 NOTE — NURSING NOTE
Chief Complaint   Patient presents with     RECHECK     Left elbow pain. Pt would like injection today. Therapies exercises, Tylenol and ibuprofen       Initial Resp 14  Ht 1.829 m (6')  Wt 95.7 kg (211 lb)  BMI 28.62 kg/m2 Estimated body mass index is 28.62 kg/(m^2) as calculated from the following:    Height as of this encounter: 1.829 m (6').    Weight as of this encounter: 95.7 kg (211 lb).  Medication Reconciliation: complete   Brenda Bonner CMA 12/21/2017 9:50 AM

## 2017-12-28 ENCOUNTER — OFFICE VISIT (OUTPATIENT)
Dept: ORTHOPEDICS | Facility: CLINIC | Age: 52
End: 2017-12-28
Payer: COMMERCIAL

## 2017-12-28 VITALS — RESPIRATION RATE: 14 BRPM | WEIGHT: 211 LBS | HEIGHT: 72 IN | BODY MASS INDEX: 28.58 KG/M2

## 2017-12-28 DIAGNOSIS — M77.02 MEDIAL EPICONDYLITIS OF ELBOW, LEFT: Primary | ICD-10-CM

## 2017-12-28 DIAGNOSIS — G56.22 LESION OF LEFT ULNAR NERVE: ICD-10-CM

## 2017-12-28 PROCEDURE — 99213 OFFICE O/P EST LOW 20 MIN: CPT | Performed by: ORTHOPAEDIC SURGERY

## 2017-12-28 RX ORDER — TRAMADOL HYDROCHLORIDE 50 MG/1
50 TABLET ORAL EVERY 6 HOURS PRN
Qty: 20 TABLET | Refills: 0 | Status: SHIPPED | OUTPATIENT
Start: 2017-12-28

## 2017-12-28 ASSESSMENT — PAIN SCALES - GENERAL: PAINLEVEL: SEVERE PAIN (7)

## 2017-12-28 NOTE — LETTER
12/28/2017         RE: James Hernandez  [address] [facility] [address]        Dear Colleague,    Thank you for referring your patient, James Hernandez, to the [facility]. Please see a copy of my visit note below.    James Hernandez is a 52 year old male who is here in follow up of medial epicondylitis of the left elbow.     Last injection(s):  1. Medial epicondyle steroid injection on 11/09/17. Length of effectiveness: 6 weeks    Treatments up to this point: Heating, injections, Gabapentin and NSAIDs    PHYSICAL EXAM:  Resp 14  Ht 1.829 m (6')  Wt 95.7 kg (211 lb)  BMI 28.62 kg/m2  GENERAL APPEARANCE: healthy, alert and no distress   SKIN: no suspicious lesions or rashes  NEURO: Normal strength and tone, mentation intact and speech normal  VASCULAR: good pulses, and cappillary refill   LYMPH: no lymphadenopathy   PSYCH:  mentation appears normal and affect normal/bright  RESP: no increased work of breathing    LEFT ELBOW EXAM:  Special tests:    Tinel's: positive over the medial epicondyle, negative over cubital tunnel    ASSESSMENT/PLAN:  Medial epicondylitis, subluxing ulnar nerve, left elbow    I attempted a steroid injection in the medial epicondyle which resulted in shooting pains in a distribution of the the ulnar nerve . After trying this twice, I aborted the injection procedure. We will order an ultrasound-guided medial epicondyle steroid injection. He wanted to try one more injection before considering surgical options. Prescription for Tramadol written. All questions were answered.    SUDHA Sandra MD  Dept. Orthopedic Surgery  [facility]    This document serves as a record of the services and decisions personally performed and made by Dr. SUDHA Sandra MD. It was created on his behalf by Gt Verduzco, a trained medical scribe. The creation of this record is based on the provider's personal observations and the statements of the patient. This document has been checked  and approved by the attending provider.   Gt Verduzco December 28, 2017 4:50 PM    Again, thank you for allowing me to participate in the care of your patient.        Sincerely,        Vineet Sandra MD

## 2017-12-28 NOTE — NURSING NOTE
Chief Complaint   Patient presents with     RECHECK     Left elbow Pt would like injection. Pt states in the last week elbow has gotten worse ut he massges the area it seems to help.        Initial Resp 14  Ht 1.829 m (6')  Wt 95.7 kg (211 lb)  BMI 28.62 kg/m2 Estimated body mass index is 28.62 kg/(m^2) as calculated from the following:    Height as of this encounter: 1.829 m (6').    Weight as of this encounter: 95.7 kg (211 lb).  Medication Reconciliation: antonella Bonner CMA 12/28/2017 1:07 PM

## 2017-12-28 NOTE — PROGRESS NOTES
James Hernandez is a 52 year old male who is here in follow up of medial epicondylitis of the left elbow.     Last injection(s):  1. Medial epicondyle steroid injection on 11/09/17. Length of effectiveness: 6 weeks    Treatments up to this point: Heating, injections, Gabapentin and NSAIDs    PHYSICAL EXAM:  Resp 14  Ht 1.829 m (6')  Wt 95.7 kg (211 lb)  BMI 28.62 kg/m2  GENERAL APPEARANCE: healthy, alert and no distress   SKIN: no suspicious lesions or rashes  NEURO: Normal strength and tone, mentation intact and speech normal  VASCULAR: good pulses, and cappillary refill   LYMPH: no lymphadenopathy   PSYCH:  mentation appears normal and affect normal/bright  RESP: no increased work of breathing    LEFT ELBOW EXAM:  Special tests:    Tinel's: positive over the medial epicondyle, negative over cubital tunnel    ASSESSMENT/PLAN:  Medial epicondylitis, subluxing ulnar nerve, left elbow    I attempted a steroid injection in the medial epicondyle which resulted in shooting pains in a distribution of the the ulnar nerve . After trying this twice, I aborted the injection procedure. We will order an ultrasound-guided medial epicondyle steroid injection. He wanted to try one more injection before considering surgical options. Prescription for Tramadol written. All questions were answered.    SUDHA Sandra MD  Dept. Orthopedic Surgery  [facility]    This document serves as a record of the services and decisions personally performed and made by Dr. SUDHA Sandra MD. It was created on his behalf by Gt Verduzco, a trained medical scribe. The creation of this record is based on the provider's personal observations and the statements of the patient. This document has been checked and approved by the attending provider.   Gt Verduzco December 28, 2017 4:50 PM

## 2017-12-28 NOTE — MR AVS SNAPSHOT
After Visit Summary   12/28/2017    James Hernandez    MRN: [id number]           Patient Information     Date Of Birth          1965        Visit Information        Provider Department      12/28/2017 1:00 PM Vineet Sandra MD [facility]        Today's Diagnoses     Medial epicondylitis of elbow, left    -  1    Lesion of left ulnar nerve           Follow-ups after your visit        Additional Services     ORTHO  REFERRAL       [facility] is referring you to the Orthopedic  Services at [facility].       The  Representative will assist you in the coordination of your Orthopedic and Musculoskeletal Care as prescribed by your physician.    The  Representative will call you within 1 business day to help schedule your appointment, or you may contact the  Representative at:    All areas ~ [phone]     Type of Referral : Surgical / Specialist  Luis Du DO      Timeframe requested: 1 - 2 weeks    Procedure: Left ultrasound guided medial epicondylar elbow cortisone injection.    Coverage of these services is subject to the terms and limitations of your health insurance plan.  Please call member services at your health plan with any benefit or coverage questions.      If X-rays, CT or MRI's have been performed, please contact the facility where they were done to arrange for , prior to your scheduled appointment.  Please bring this referral request to your appointment and present it to your specialist.                     Your next 10 appointments already scheduled     Jan 12, 2018  1:20 PM CST   New Visit with Luis Du DO   [facility] [place] ([facility] [place])    [address] [address]  [place] [id number] [phone]              Who to contact     If you have questions or need follow up information about today's clinic  "visit or your schedule please contact [facility] directly at [phone].  Normal or non-critical lab and imaging results will be communicated to you by MyChart, letter or phone within 4 business days after the clinic has received the results. If you do not hear from us within 7 days, please contact the clinic through MyChart or phone. If you have a critical or abnormal lab result, we will notify you by phone as soon as possible.  Submit refill requests through [employer] or call your pharmacy and they will forward the refill request to us. Please allow 3 business days for your refill to be completed.          Additional Information About Your Visit        MyChart Information     [employer] lets you send messages to your doctor, view your test results, renew your prescriptions, schedule appointments and more. To sign up, go to [email or web address] . Click on \"Log in\" on the left side of the screen, which will take you to the Welcome page. Then click on \"Sign up Now\" on the right side of the page.     You will be asked to enter the access code listed below, as well as some personal information. Please follow the directions to create your username and password.     Your access code is: [email or web address]  Expires: 2/3/2018  3:00 PM     Your access code will  in 90 days. If you need help or a new code, please call your [place] clinic or [phone].        Care EveryWhere ID     This is your Care EveryWhere ID. This could be used by other organizations to access your [place] medical records  [id number]        Your Vitals Were     Respirations Height BMI (Body Mass Index)             14 1.829 m (6') 28.62 kg/m2          Blood Pressure from Last 3 Encounters:   17 162/83    Weight from Last 3 Encounters:   17 95.7 kg (211 lb)   17 95.7 kg (211 lb)   17 95.7 kg (211 lb)              We Performed the Following     ORTHO  REFERRAL          Today's Medication Changes        "   These changes are accurate as of: 12/28/17 11:59 PM.  If you have any questions, ask your nurse or doctor.               Start taking these medicines.        Dose/Directions    traMADol 50 MG tablet   Commonly known as:  ULTRAM   Used for:  Lesion of left ulnar nerve   Started by:  Vineet Sandra MD        Dose:  50 mg   Take 1 tablet (50 mg) by mouth every 6 hours as needed for pain   Quantity:  20 tablet   Refills:  0            Where to get your medicines      Some of these will need a paper prescription and others can be bought over the counter.  Ask your nurse if you have questions.     Bring a paper prescription for each of these medications     traMADol 50 MG tablet                Primary Care Provider Office Phone # Fax #    [facility] [phone] [address] [facility] [address]        Equal Access to Services     SAUNDRA MARTINEZ : Kayla perezo Soluke, waaxda luqadaha, qaybta kaalmada adeegyada, jamal forrester . So [facility] [phone].    ATENCIÓN: Si habla español, tiene a cutler disposición servicios gratuitos de asistencia lingüística. Llame al [phone].    We comply with applicable federal civil rights laws and [place] laws. We do not discriminate on the basis of race, color, national origin, age, disability, sex, sexual orientation, or gender identity.            Thank you!     Thank you for choosing [facility]  for your care. Our goal is always to provide you with excellent care. Hearing back from our patients is one way we can continue to improve our services. Please take a few minutes to complete the written survey that you may receive in the mail after your visit with us. Thank you!             Your Updated Medication List - Protect others around you: Learn how to safely use, store and throw away your medicines at www.disposemymeds.org.          This list is accurate as of: 12/28/17 11:59 PM.  Always use  your most recent med list.                   Brand Name Dispense Instructions for use Diagnosis    IBUPROFEN PO      Take 600 mg by mouth as needed for moderate pain        traMADol 50 MG tablet    ULTRAM    20 tablet    Take 1 tablet (50 mg) by mouth every 6 hours as needed for pain    Lesion of left ulnar nerve       TYLENOL PO      Take 500 mg by mouth as needed for mild pain or fever

## 2018-01-27 DIAGNOSIS — G56.22 ULNAR NEUROPATHY AT ELBOW OF LEFT UPPER EXTREMITY: ICD-10-CM

## 2018-01-27 DIAGNOSIS — M77.02 MEDIAL EPICONDYLITIS OF ELBOW, LEFT: ICD-10-CM

## 2018-01-30 RX ORDER — GABAPENTIN 300 MG/1
CAPSULE ORAL
Qty: 90 CAPSULE | Refills: 0 | OUTPATIENT
Start: 2018-01-30

## 2018-01-30 NOTE — TELEPHONE ENCOUNTER
Called and informed patient that he needs to establish care with a provider.Jocelyn Dos Santos MA/TC

## 2024-10-14 ENCOUNTER — APPOINTMENT (OUTPATIENT)
Dept: URBAN - METROPOLITAN AREA CLINIC 252 | Age: 59
Setting detail: DERMATOLOGY
End: 2024-10-15

## 2024-10-14 VITALS — RESPIRATION RATE: 16 BRPM | WEIGHT: 195 LBS | HEIGHT: 72 IN

## 2024-10-14 DIAGNOSIS — L57.0 ACTINIC KERATOSIS: ICD-10-CM

## 2024-10-14 DIAGNOSIS — D49.2 NEOPLASM OF UNSPECIFIED BEHAVIOR OF BONE, SOFT TISSUE, AND SKIN: ICD-10-CM

## 2024-10-14 DIAGNOSIS — L82.1 OTHER SEBORRHEIC KERATOSIS: ICD-10-CM

## 2024-10-14 PROCEDURE — OTHER COUNSELING: OTHER

## 2024-10-14 PROCEDURE — 11102 TANGNTL BX SKIN SINGLE LES: CPT

## 2024-10-14 PROCEDURE — 17003 DESTRUCT PREMALG LES 2-14: CPT

## 2024-10-14 PROCEDURE — OTHER BIOPSY BY SHAVE METHOD: OTHER

## 2024-10-14 PROCEDURE — 99202 OFFICE O/P NEW SF 15 MIN: CPT | Mod: 25

## 2024-10-14 PROCEDURE — 17000 DESTRUCT PREMALG LESION: CPT | Mod: 59

## 2024-10-14 PROCEDURE — OTHER LIQUID NITROGEN: OTHER

## 2024-10-14 ASSESSMENT — LOCATION SIMPLE DESCRIPTION DERM
LOCATION SIMPLE: RIGHT ZYGOMA
LOCATION SIMPLE: RIGHT UPPER BACK
LOCATION SIMPLE: POSTERIOR SCALP
LOCATION SIMPLE: RIGHT OCCIPITAL SCALP
LOCATION SIMPLE: RIGHT EAR

## 2024-10-14 ASSESSMENT — LOCATION ZONE DERM
LOCATION ZONE: EAR
LOCATION ZONE: SCALP
LOCATION ZONE: FACE
LOCATION ZONE: TRUNK

## 2024-10-14 ASSESSMENT — LOCATION DETAILED DESCRIPTION DERM
LOCATION DETAILED: MID-OCCIPITAL SCALP
LOCATION DETAILED: RIGHT SUPERIOR OCCIPITAL SCALP
LOCATION DETAILED: RIGHT LATERAL UPPER BACK
LOCATION DETAILED: RIGHT SUPERIOR HELIX
LOCATION DETAILED: RIGHT LATERAL ZYGOMA

## 2024-10-14 NOTE — PROCEDURE: BIOPSY BY SHAVE METHOD
Anesthesia Volume In Cc: 0.3
Validate Note Data (See Information Below): No
Post-Care Instructions: WOUND CARE:\\nDo NOT submerge wound in a bath, swimming pool, or hot tub for at least 1 week or for as long as there is an open wound. Gently cleanse the site daily with mild soap and water. Be careful NOT to allow a forceful stream of water to hit the biopsy site. After cleaning/showering, apply a thin layer of petrolatum ointment or Aquaphor in the wound followed by an adhesive bandage. Continue this process daily until healed. \\n\\nBLEEDING:\\nIf you develop persistent bleeding, apply firm and steady pressure over the dressing with gauze for 15 minutes. If bleeding persists, reapply pressure with an ice pack over the gauze for 15 minutes. NEVER APPLY ICE DIRECTLY TO THE SKIN. Do NOT peek under the gauze during these 15 minutes of pressure.  Call our office at [phone] if you cannot get the bleeding to stop. \\n\\nINFECTION:\\nSigns of infection may include increasing rather than decreasing pain (after the first few days), increasing redness/swelling/heat, draining pus, pink/red streaks around the wound, and fever or chills.  Please call our office immediately at [phone] if infection is suspected. It is normal to have some mild redness on or around the wound edges; this will lighten day by day and will become less tender.\\n\\nPAIN:\\nPain is usually minimal, but if needed you may take acetaminophen (Tylenol) every four hours as needed. Applying an ice pack over the dressing for 15-20 minutes every 2-3 hours will relieve swelling, lessen pain, and help minimize bruising. NEVER APPLY ICE DIRECTLY TO THE SKIN. Avoid ibuprofen (Advil, Motrin) and naproxen (Aleve) for the first 48 hours as these can increase bleeding.\\n\\nSWELLING AND BRUISING:\\nSwelling and bruising are common and temporary, usually lasting 1 - 2 weeks. It is more common in areas treated around the eyes, hands, and feet. In the days following the procedure, swelling and bruising can be minimized by keeping the affected areas elevated when possible, reducing salty foods, and applying ice packs over the dressing for 15-20 minutes every 2-3 hours. NEVER APPLY ICE DIRECTLY TO THE SKIN.\\n\\nITCHING:\\nItchiness on and around the wound is very common and can last several days to weeks after surgery. Mild itch is normal as the wound is healing. \\n\\nNERVE CHANGES:\\nNumbness is usually temporary, but it may last for several weeks to months. You may also experience sharp pains at the wound sight as it heals.  Mild pain is normal and will gradually improve with time.\\n \\nNO SMOKING:\\nSmoking will delay the healing process. If you smoke, we recommend trying to quit or at minimum reduce how much you smoke following a procedure.
Hide Additional Anticipated Plan?: Yes
Curettage Text: The wound bed was treated with curettage after the biopsy was performed.
Lab: -[id number]
Cryotherapy Text: The wound bed was treated with cryotherapy after the biopsy was performed.
Notification Instructions: - It can take up to 2 -3 weeks to get a biopsy result. \\n- Please refrain from calling to request results until after 2 weeks.
Detail Level: Detailed
Dressing: bandage
Biopsy Type: H and E
Size Of Lesion In Cm: 0
Depth Of Biopsy: dermis
Anesthesia Type: 1% lidocaine with epinephrine
Electrodesiccation Text: The wound bed was treated with electrodesiccation after the biopsy was performed.
Electrodesiccation And Curettage Text: The wound bed was treated with electrodesiccation and curettage after the biopsy was performed.
Biopsy Method: Dermablade
Hemostasis: Drysol
Consent: - Consent was obtained and risks were reviewed prior to procedure today. All questions were answered prior to procedure today.\\n- Risks discussed include but are not limited to scarring, infection, bleeding, scabbing, incomplete removal, nerve damage, pain, and allergy to anesthesia.
Information: Selecting Yes will display possible errors in your note based on the variables you have selected. This validation is only offered as a suggestion for you. PLEASE NOTE THAT THE VALIDATION TEXT WILL BE REMOVED WHEN YOU FINALIZE YOUR NOTE. IF YOU WANT TO FAX A PRELIMINARY NOTE YOU WILL NEED TO TOGGLE THIS TO 'NO' IF YOU DO NOT WANT IT IN YOUR FAXED NOTE.
Type Of Destruction Used: Curettage
Wound Care: Aquaphor
Billing Type: Third-Party Bill
Silver Nitrate Text: The wound bed was treated with silver nitrate after the biopsy was performed.

## 2024-10-14 NOTE — PROCEDURE: COUNSELING
Detail Level: Zone
Detail Level: Detailed
Patient Specific Counseling (Will Not Stick From Patient To Patient): - Discussed that this nevus has atypical features that warrant a biopsy.\\n- Patient expressed understanding.\\n- Follow-up for re-examination for regimentation or an additional removal procedure may become necessary depending the pathologist's report.

## 2024-10-14 NOTE — PROCEDURE: LIQUID NITROGEN
Detail Level: Detailed
Consent: - Discussed that these are a result of cumulative sun exposure.\\n- Consent was obtained and risks were reviewed prior to procedure today. All questions were answered prior to procedure today.\\n- Risks discussed include but are not limited to pain, crusting, scabbing, blistering, scarring, temporary or permanent darker or lighter pigmentary change, recurrence, incomplete resolution, and infection.
Show Applicator Variable?: Yes
Post-Care Instructions: - Patient was instructed to avoid picking at any of the treated lesions.
Duration Of Freeze Thaw-Cycle (Seconds): 0
Show Aperture Variable?: No
Application Tool (Optional): Liquid Nitrogen Sprayer